# Patient Record
Sex: FEMALE | Race: WHITE | HISPANIC OR LATINO | Employment: FULL TIME | URBAN - METROPOLITAN AREA
[De-identification: names, ages, dates, MRNs, and addresses within clinical notes are randomized per-mention and may not be internally consistent; named-entity substitution may affect disease eponyms.]

---

## 2018-11-14 ENCOUNTER — NON-PROVIDER VISIT (OUTPATIENT)
Dept: URGENT CARE | Facility: CLINIC | Age: 48
End: 2018-11-14

## 2018-11-14 DIAGNOSIS — Z02.1 PRE-EMPLOYMENT DRUG SCREENING: ICD-10-CM

## 2018-11-14 LAB
AMP AMPHETAMINE: NORMAL
BAR BARBITURATES: NORMAL
BZO BENZODIAZEPINES: NORMAL
COC COCAINE: NORMAL
INT CON NEG: NORMAL
INT CON POS: NORMAL
MDMA ECSTASY: NORMAL
MET METHAMPHETAMINES: NORMAL
MTD METHADONE: NORMAL
OPI OPIATES: NORMAL
OXY OXYCODONE: NORMAL
PCP PHENCYCLIDINE: NORMAL
POC URINE DRUG SCREEN OCDRS: NEGATIVE
THC: NORMAL

## 2018-11-14 PROCEDURE — 80305 DRUG TEST PRSMV DIR OPT OBS: CPT | Performed by: PHYSICIAN ASSISTANT

## 2021-03-25 ENCOUNTER — NON-PROVIDER VISIT (OUTPATIENT)
Dept: URGENT CARE | Facility: PHYSICIAN GROUP | Age: 51
End: 2021-03-25

## 2021-03-25 DIAGNOSIS — Z02.1 PRE-EMPLOYMENT DRUG SCREENING: ICD-10-CM

## 2021-03-25 LAB
AMP AMPHETAMINE: NORMAL
COC COCAINE: NORMAL
INT CON NEG: NORMAL
INT CON POS: NORMAL
MET METHAMPHETAMINES: NORMAL
OPI OPIATES: NORMAL
PCP PHENCYCLIDINE: NORMAL
POC DRUG COMMENT 753798-OCCUPATIONAL HEALTH: NORMAL
THC: NORMAL

## 2021-03-25 PROCEDURE — 80305 DRUG TEST PRSMV DIR OPT OBS: CPT | Performed by: FAMILY MEDICINE

## 2022-06-27 ENCOUNTER — TELEPHONE (OUTPATIENT)
Dept: SCHEDULING | Facility: IMAGING CENTER | Age: 52
End: 2022-06-27

## 2022-06-28 ENCOUNTER — OFFICE VISIT (OUTPATIENT)
Dept: MEDICAL GROUP | Facility: IMAGING CENTER | Age: 52
End: 2022-06-28
Payer: COMMERCIAL

## 2022-06-28 VITALS
BODY MASS INDEX: 35.89 KG/M2 | SYSTOLIC BLOOD PRESSURE: 132 MMHG | DIASTOLIC BLOOD PRESSURE: 84 MMHG | HEART RATE: 66 BPM | HEIGHT: 65 IN | TEMPERATURE: 97.7 F | WEIGHT: 215.4 LBS | OXYGEN SATURATION: 98 %

## 2022-06-28 DIAGNOSIS — Z13.6 SCREENING FOR CARDIOVASCULAR CONDITION: ICD-10-CM

## 2022-06-28 DIAGNOSIS — Z12.11 SCREENING FOR COLORECTAL CANCER: ICD-10-CM

## 2022-06-28 DIAGNOSIS — R21 RASH: ICD-10-CM

## 2022-06-28 DIAGNOSIS — Z76.89 ENCOUNTER TO ESTABLISH CARE WITH NEW DOCTOR: ICD-10-CM

## 2022-06-28 DIAGNOSIS — Z11.59 NEED FOR HEPATITIS C SCREENING TEST: ICD-10-CM

## 2022-06-28 DIAGNOSIS — Z13.31 DEPRESSION SCREENING: ICD-10-CM

## 2022-06-28 DIAGNOSIS — Z12.12 SCREENING FOR COLORECTAL CANCER: ICD-10-CM

## 2022-06-28 DIAGNOSIS — Z13.1 DIABETES MELLITUS SCREENING: ICD-10-CM

## 2022-06-28 DIAGNOSIS — Z12.31 ENCOUNTER FOR SCREENING MAMMOGRAM FOR BREAST CANCER: ICD-10-CM

## 2022-06-28 PROCEDURE — 99214 OFFICE O/P EST MOD 30 MIN: CPT | Performed by: CLINICAL NURSE SPECIALIST

## 2022-06-28 RX ORDER — CEPHALEXIN 500 MG/1
500 CAPSULE ORAL 4 TIMES DAILY
Qty: 28 CAPSULE | Refills: 0 | Status: SHIPPED | OUTPATIENT
Start: 2022-06-28

## 2022-06-28 ASSESSMENT — ANXIETY QUESTIONNAIRES
2. NOT BEING ABLE TO STOP OR CONTROL WORRYING: MORE THAN HALF THE DAYS
3. WORRYING TOO MUCH ABOUT DIFFERENT THINGS: MORE THAN HALF THE DAYS
1. FEELING NERVOUS, ANXIOUS, OR ON EDGE: MORE THAN HALF THE DAYS
4. TROUBLE RELAXING: MORE THAN HALF THE DAYS
GAD7 TOTAL SCORE: 8
5. BEING SO RESTLESS THAT IT IS HARD TO SIT STILL: NOT AT ALL
7. FEELING AFRAID AS IF SOMETHING AWFUL MIGHT HAPPEN: NOT AT ALL
6. BECOMING EASILY ANNOYED OR IRRITABLE: NOT AT ALL

## 2022-06-28 ASSESSMENT — PAIN SCALES - GENERAL: PAINLEVEL: 4=SLIGHT-MODERATE PAIN

## 2022-06-28 ASSESSMENT — PATIENT HEALTH QUESTIONNAIRE - PHQ9: CLINICAL INTERPRETATION OF PHQ2 SCORE: 0

## 2022-06-28 NOTE — ASSESSMENT & PLAN NOTE
Firm, itchy papules/nodules erupted 10 days ago starting on scalp and 4 days ago moved to face and behind ear.  Area itches slightly and is tender to touch.  She tried Benadryl with no relief.  No fevers or chills. Mild sore throat yesterday but not today.  She ate sushi the day of the initial lesion.

## 2022-06-28 NOTE — PROGRESS NOTES
"Subjective     Marylu Sanchez is a 52 y.o. female who presents with Bump (Started two weeks ago on head, and now on face and behind left ear. Itching and redness ) and Establish Care            HPI  Marylu is in clinic to establish care. She is [place] speaking.    Rash  Firm, itchy papules/nodules erupted 10 days ago starting on scalp and 4 days ago moved to face and behind ear.  Area itches slightly and is tender to touch.  She tried Benadryl with no relief.  No fevers or chills. Mild sore throat yesterday but not today.  She ate sushi the day of the initial lesion.       ROS  See HPI      No Known Allergies    No current outpatient medications on file prior to visit.     No current facility-administered medications on file prior to visit.           Objective     /84 (BP Location: Left arm, Patient Position: Sitting, BP Cuff Size: Adult)   Pulse 66   Temp 36.5 °C (97.7 °F) (Temporal)   Ht 1.651 m (5' 5\")   Wt 97.7 kg (215 lb 6.4 oz)   SpO2 98%   BMI 35.84 kg/m²      Physical Exam  Constitutional:       General: She is not in acute distress.     Appearance: Normal appearance. She is not ill-appearing, toxic-appearing or diaphoretic.   HENT:      Head: Normocephalic and atraumatic.      Mouth/Throat:      Pharynx: No oropharyngeal exudate or posterior oropharyngeal erythema.   Eyes:      General: No scleral icterus.     Pupils: Pupils are equal, round, and reactive to light.   Cardiovascular:      Rate and Rhythm: Normal rate.   Pulmonary:      Effort: Pulmonary effort is normal.   Skin:     General: Skin is warm and dry.      Comments: Multiple red papules and nodules at anterior hairline, forehead, left upper eyelid and behind left ear.  Hairline papule excoriated.   Neurological:      Mental Status: She is alert and oriented to person, place, and time.      Gait: Gait normal.   Psychiatric:         Mood and Affect: Mood normal.         Behavior: Behavior normal.         Thought Content: " Thought content normal.         Judgment: Judgment normal.                             Assessment & Plan       1. Encounter to establish care with new doctor      2. Depression screening  Score 0    3. Rash  Marylu has a papular erythematous rash on the central and left side of her forehead, left eyelid and left ear.  The papules appear and feel to be somewhat cystic.  They are not a typical presentation for shingles.her left eye is red.  She does deny any ocular changes.  Benadryl did not help.    I believe there is a bacterial component and I am concerned about the eye involvement.  She will be prescribed Keflex 500 mg to be taken 4 times a day for a week.  Marylu is also to apply warm moist compresses to the affected area multiple times a day.    If symptoms fail to improve or if they worsen, she is to notify me immediately.  If her vision changes or she becomes photophobic, she is to go to the emergency room.    - cephALEXin (KEFLEX) 500 MG Cap; Take 1 Capsule by mouth 4 times a day.  Dispense: 28 Capsule; Refill: 0  -Referral to dermatology    4. Encounter for screening mammogram for breast cancer    - MA-SCREENING MAMMO BILAT W/TOMOSYNTHESIS W/CAD; Future    5. Screening for colorectal cancer    - Referral to GI for Colonoscopy    6. Need for hepatitis C screening test    - HEP C VIRUS ANTIBODY; Future    7. Diabetes mellitus screening    - Comp Metabolic Panel; Future  - HEMOGLOBIN A1C; Future    8. Screening for cardiovascular condition    - CBC WITH DIFFERENTIAL; Future  - Lipid Profile; Future  - TSH WITH REFLEX TO FT4; Future  - VITAMIN D,25 HYDROXY; Future    Return in about 1 week (around 7/5/2022), or if symptoms worsen or fail to improve.

## 2022-07-27 ENCOUNTER — TELEPHONE (OUTPATIENT)
Dept: HEALTH INFORMATION MANAGEMENT | Facility: OTHER | Age: 52
End: 2022-07-27

## 2023-04-10 ENCOUNTER — OFFICE VISIT (OUTPATIENT)
Dept: URGENT CARE | Facility: PHYSICIAN GROUP | Age: 53
End: 2023-04-10
Payer: COMMERCIAL

## 2023-04-10 VITALS
OXYGEN SATURATION: 97 % | TEMPERATURE: 98.2 F | HEART RATE: 62 BPM | WEIGHT: 210 LBS | SYSTOLIC BLOOD PRESSURE: 132 MMHG | RESPIRATION RATE: 16 BRPM | DIASTOLIC BLOOD PRESSURE: 84 MMHG | BODY MASS INDEX: 35.85 KG/M2 | HEIGHT: 64 IN

## 2023-04-10 DIAGNOSIS — M25.562 ACUTE PAIN OF LEFT KNEE: ICD-10-CM

## 2023-04-10 PROCEDURE — 99213 OFFICE O/P EST LOW 20 MIN: CPT

## 2023-04-10 RX ORDER — ACETAMINOPHEN 500 MG
500 TABLET ORAL ONCE
Status: COMPLETED | OUTPATIENT
Start: 2023-04-10 | End: 2023-04-10

## 2023-04-10 RX ORDER — KETOROLAC TROMETHAMINE 30 MG/ML
15 INJECTION, SOLUTION INTRAMUSCULAR; INTRAVENOUS ONCE
Status: COMPLETED | OUTPATIENT
Start: 2023-04-10 | End: 2023-04-10

## 2023-04-10 RX ADMIN — KETOROLAC TROMETHAMINE 15 MG: 30 INJECTION, SOLUTION INTRAMUSCULAR; INTRAVENOUS at 17:57

## 2023-04-10 RX ADMIN — Medication 500 MG: at 17:56

## 2023-04-10 NOTE — LETTER
[facility] [place]  [address]  [place] [address]     April 10, 2023    Patient: Marylu Sanchez   YOB: 1970   Date of Visit: 4/10/2023       To Whom It May Concern:    Marylu Sanchez was seen and treated in our department on 4/10/2023.     Sincerely,     RHONDA Perez.

## 2023-04-11 NOTE — PROGRESS NOTES
Chief Complaint   Patient presents with    Knee Pain     Left knee,painful,x5 days       HISTORY OF PRESENT ILLNESS: Patient is a pleasant 53 y.o. female who presents to urgent care today left sided knee pain for the last 5 days, no known cause of injury.  No redness or swelling.  She is able to ambulate.  Took motrin with little relief.      Patient Active Problem List    Diagnosis Date Noted    Rash 06/28/2022       Allergies:Patient has no known allergies.    Current Outpatient Medications Ordered in Epic   Medication Sig Dispense Refill    cephALEXin (KEFLEX) 500 MG Cap Take 1 Capsule by mouth 4 times a day. (Patient not taking: Reported on 4/10/2023) 28 Capsule 0     No current Epic-ordered facility-administered medications on file.       No past medical history on file.    Social History     Tobacco Use    Smoking status: Never    Smokeless tobacco: Never   Vaping Use    Vaping Use: Never used   Substance Use Topics    Alcohol use: Never    Drug use: Never       No family status information on file.   No family history on file.    ROS:  Review of Systems   Constitutional: Negative for fever, chills, weight loss, malaise, and fatigue.   HENT: Negative for ear pain, nosebleeds, congestion, sore throat and neck pain.    Eyes: Negative for vision changes.   Neuro: Negative for headache, sensory changes, weakness, seizure, LOC.   Cardiovascular: Negative for chest pain, palpitations, orthopnea and leg swelling.   Respiratory: Negative for cough, sputum production, shortness of breath and wheezing.   Gastrointestinal: Negative for abdominal pain, nausea, vomiting or diarrhea.   Genitourinary: Negative for dysuria, urgency and frequency.  Musculoskeletal: Negative for falls, neck pain, back pain, joint pain, myalgias.  Noted pain with ambulation to the right knee.  Skin: Negative for rash, diaphoresis.  No redness or swelling noted to her knee.    Exam:  /84 (BP Location: Right arm, Patient Position: Sitting,  "BP Cuff Size: Adult)   Pulse 62   Temp 36.8 °C (98.2 °F) (Temporal)   Resp 16   Ht 1.626 m (5' 4\")   Wt 95.3 kg (210 lb)   SpO2 97%   General: well-nourished, well-developed female in NAD  Head: normocephalic, atraumatic  Eyes: PERRLA, no conjunctival injection, acuity grossly intact, lids normal.  Ears: normal shape and symmetry, no tenderness, no discharge. External canals are without any significant edema or erythema. Tympanic membranes are without any inflammation, no effusion. Gross auditory acuity is intact.  Nose: symmetrical without tenderness, no discharge.  Mouth/Throat: reasonable hygiene, no erythema, exudates or tonsillar enlargement.  Neck: no masses, range of motion within normal limits, no tracheal deviation. No obvious thyroid enlargement.   Lymph: no cervical adenopathy. No supraclavicular adenopathy.   Neuro: alert and oriented. Cranial nerves 1-12 grossly intact. No sensory deficit.   Cardiovascular: regular rate and rhythm. No edema.  Pulmonary: no distress. Chest is symmetrical with respiration, no wheezes, crackles, or rhonchi.   Abdomen: soft, non-tender, no guarding, no hepatosplenomegaly.  Musculoskeletal: no clubbing, appropriate muscle tone, gait is stable.  No pain with extension, rotation of the right knee.  Skin: warm, dry, intact, no clubbing, no cyanosis, no rashes.   Psych: appropriate mood, affect, judgement.         Assessment/Plan:  1. Acute pain of left knee  ketorolac (TORADOL) injection 15 mg    acetaminophen (TYLENOL) tablet 500 mg    Referral to establish with [facility]      This is a 53-year-old female comes in with her  today, she is Serbian-speaking, translation services were utilized.  Patient has ongoing complaints of left-sided knee pain for the last 5 days.  No known cause of injury.  She states it hurts mostly with ambulation.  On physical assessment she has no noted pain with extension, or rotation of her knee.  She has only taken Motrin for relief.  " Toradol, Tylenol were given here in the office today for comfort measures.  Referral was placed to establish care with [facility] if she is not currently established.  Patient advised to continue to take Motrin and Tylenol, imaging not indicated at this time.  Follow-up as needed.    Supportive care, differential diagnoses, and indications for immediate follow-up discussed with patient.   Pathogenesis of diagnosis discussed including typical length and natural progression.   Instructed to return to clinic or nearest emergency department for any change in condition, further concerns, or worsening of symptoms.  Patient states understanding of the plan of care and discharge instructions.  Instructed to make an appointment, for follow up, with  primary care provider.        Please note that this dictation was created using voice recognition software. I have made every reasonable attempt to correct obvious errors, but I expect that there are errors of grammar and possibly content that I did not discover before finalizing the note.      Brisa DAVILA

## 2023-04-21 ENCOUNTER — TELEPHONE (OUTPATIENT)
Dept: HEALTH INFORMATION MANAGEMENT | Facility: OTHER | Age: 53
End: 2023-04-21
Payer: COMMERCIAL

## 2023-04-25 ENCOUNTER — APPOINTMENT (OUTPATIENT)
Dept: RADIOLOGY | Facility: MEDICAL CENTER | Age: 53
End: 2023-04-25
Attending: EMERGENCY MEDICINE
Payer: COMMERCIAL

## 2023-04-25 ENCOUNTER — HOSPITAL ENCOUNTER (EMERGENCY)
Facility: MEDICAL CENTER | Age: 53
End: 2023-04-25
Attending: EMERGENCY MEDICINE
Payer: COMMERCIAL

## 2023-04-25 VITALS
HEART RATE: 65 BPM | RESPIRATION RATE: 16 BRPM | OXYGEN SATURATION: 97 % | SYSTOLIC BLOOD PRESSURE: 165 MMHG | TEMPERATURE: 96.6 F | BODY MASS INDEX: 36.78 KG/M2 | DIASTOLIC BLOOD PRESSURE: 83 MMHG | WEIGHT: 214.29 LBS

## 2023-04-25 DIAGNOSIS — S83.412A SPRAIN OF MEDIAL COLLATERAL LIGAMENT OF LEFT KNEE, INITIAL ENCOUNTER: ICD-10-CM

## 2023-04-25 PROCEDURE — 73564 X-RAY EXAM KNEE 4 OR MORE: CPT | Mod: LT

## 2023-04-25 PROCEDURE — 700102 HCHG RX REV CODE 250 W/ 637 OVERRIDE(OP): Performed by: EMERGENCY MEDICINE

## 2023-04-25 PROCEDURE — A9270 NON-COVERED ITEM OR SERVICE: HCPCS | Performed by: EMERGENCY MEDICINE

## 2023-04-25 PROCEDURE — 99284 EMERGENCY DEPT VISIT MOD MDM: CPT

## 2023-04-25 PROCEDURE — 700101 HCHG RX REV CODE 250: Performed by: EMERGENCY MEDICINE

## 2023-04-25 RX ORDER — DICLOFENAC SODIUM 75 MG/1
75 TABLET, DELAYED RELEASE ORAL 2 TIMES DAILY
Qty: 60 TABLET | Refills: 0 | Status: SHIPPED | OUTPATIENT
Start: 2023-04-25

## 2023-04-25 RX ORDER — HYDROCODONE BITARTRATE AND ACETAMINOPHEN 5; 325 MG/1; MG/1
1 TABLET ORAL ONCE
Status: COMPLETED | OUTPATIENT
Start: 2023-04-25 | End: 2023-04-25

## 2023-04-25 RX ORDER — LIDOCAINE 50 MG/G
1 PATCH TOPICAL ONCE
Status: DISCONTINUED | OUTPATIENT
Start: 2023-04-25 | End: 2023-04-25 | Stop reason: HOSPADM

## 2023-04-25 RX ADMIN — HYDROCODONE BITARTRATE AND ACETAMINOPHEN 1 TABLET: 5; 325 TABLET ORAL at 08:15

## 2023-04-25 RX ADMIN — LIDOCAINE 1 PATCH: 50 PATCH CUTANEOUS at 08:15

## 2023-04-25 ASSESSMENT — PAIN DESCRIPTION - PAIN TYPE
TYPE: ACUTE PAIN
TYPE: ACUTE PAIN

## 2023-04-25 NOTE — ED TRIAGE NOTES
Chief Complaint   Patient presents with    Leg Pain     Left knee x1.5 weeks. Has been taking ibuprofen but not improving.     Pt ambulatory to triage.  Denies injury.  BP (!) 181/95   Pulse 64   Temp 35.8 °C (96.5 °F) (Temporal)   Resp 14   Wt 97.2 kg (214 lb 4.6 oz)   SpO2 99%   Pt informed of wait times. Educated on triage process.  Asked to return to triage RN for any new or worsening of symptoms. Thanked for patience.

## 2023-04-25 NOTE — ED NOTES
Pt wheeled back to room by family. Pt able to stand and walk to bed with out assistance. Pt is gowned sitting up right in bed with no needs at this time. Chart up for ERP.

## 2023-04-25 NOTE — DISCHARGE INSTRUCTIONS
Use the Ace bandage, you can also  some numbing patches at [facility] or [facility], these are over-the-counter, look for Lidoderm patches.  IcyHot or Tiger balm may also help.  Take the Voltaren as needed, use the Ace bandage to help support your knee.

## 2023-04-25 NOTE — Clinical Note
Marylu Sanchez was seen and treated in our emergency department on 4/25/2023.  She may return to work on 05/02/2023.       If you have any questions or concerns, please don't hesitate to call.      Esvin Schwab M.D.

## 2023-04-25 NOTE — ED PROVIDER NOTES
ED Provider Note    CHIEF COMPLAINT  Chief Complaint   Patient presents with    Leg Pain     Left knee x1.5 weeks. Has been taking ibuprofen but not improving.       EXTERNAL RECORDS REVIEWED  Office visit 4/10/2023 for knee pain    HPI/ROS  LIMITATION TO HISTORY   Select: Language Kyrgyz,  Used       Mayrlu Sanchez is a 53 y.o. female who presents leg pain for the last 1.5 weeks.  Was seen 4/10/2023 for identical complaint, she was thought to have a knee strain or sprain and therefore told to take Motrin and Tylenol, weightbearing as tolerated.  Patient reports symptoms are ongoing and therefore came to the emergency department.  Patient has a relatively physically demanding job, she is ambulatory throughout her job and has to climb multiple stairs,  things and twist often.  She denies any ankle pain or hip pain.  She denies any leg swelling.  Patient reports pain is worse bending her knee and walking up stairs.  Patient denies any associated chest pain or shortness of breath.  She denies any associated weakness or numbness.  Patient does not recall any major trauma but again she has a relatively physically demanding job    PAST MEDICAL HISTORY       SURGICAL HISTORY  patient denies any surgical history    FAMILY HISTORY  No family history on file.    SOCIAL HISTORY  Social History     Tobacco Use    Smoking status: Never    Smokeless tobacco: Never   Vaping Use    Vaping Use: Never used   Substance and Sexual Activity    Alcohol use: Never    Drug use: Never    Sexual activity: Not on file       CURRENT MEDICATIONS  Home Medications       Reviewed by Toshia Tan R.N. (Registered Nurse) on 04/25/23 at 0724  Med List Status: Partial     Medication Last Dose Status   cephALEXin (KEFLEX) 500 MG Cap  Active                    ALLERGIES  No Known Allergies    PHYSICAL EXAM  VITAL SIGNS: BP (!) 181/95   Pulse 64   Temp 35.8 °C (96.5 °F) (Temporal)   Resp 14   Wt 97.2 kg (214 lb  4.6 oz)   SpO2 99%   BMI 36.78 kg/m²    Physical Exam  Constitutional:       Appearance: Normal appearance.   HENT:      Mouth/Throat:      Mouth: Mucous membranes are moist.   Pulmonary:      Effort: Pulmonary effort is normal.   Musculoskeletal:      Comments: Ankle is unremarkable, foot is unremarkable, DP and PT are 2+.  There is no associated edema of the lower extremity.  There is no tenderness of the calf, Homans' sign is negative.  There is no tenderness of the popliteal fossa or thigh.  There is focal tenderness of the medial joint line, medial along the MCL ligament.  Pain is provoked on valgus stressing, no pain on varus stressing, anterior drawer and posterior drawer are unremarkable.  McArdle's test is negative.  No tenderness of the patella.  No joint laxity   Neurological:      General: No focal deficit present.      Mental Status: She is alert and oriented to person, place, and time.   Psychiatric:         Mood and Affect: Mood normal.         DIAGNOSTIC STUDIES / PROCEDURES    RADIOLOGY  I have independently interpreted the diagnostic imaging associated with this visit and am waiting the final reading from the radiologist.   My preliminary interpretation is as follows: unremarkable x-ray of knee  Radiologist interpretation:   DX-KNEE COMPLETE 4+ LEFT   Final Result         1.  No acute traumatic bony injury.            COURSE & MEDICAL DECISION MAKING        INITIAL ASSESSMENT, COURSE AND PLAN  Care Narrative: Here with what appears to be a very mild MCL ligament injury.  She is very well-appearing otherwise.  Neurovascular intact.  She has no associated leg swelling, no associated tenderness of the posterior vascular compartments, to suggest a DVT.  I have a very low suspicion of this.  Will check x-ray for possible occult bony abnormality though my suspicion of fracture is low here.  X-rays unremarkable.  Patient will be discharged home with Voltaren, instructions to take Lidoderm patches,  IcyHot and will also be given a Ace bandage.  If symptoms fail to resolve despite a week off of work or they are worsening patient will follow-up with orthopedics, I provided a referral.        DISPOSITION AND DISCUSSIONS    Escalation of care considered, and ultimately not performed: Patient without any associated fevers, she has full range of motion of her knee, she point tenderness.  No symptoms to suggest a DVT and exam inconsistent with this.  Therefore imaging such as ultrasound was deferred, basic labs and blood cultures as well as inflammatory markers deferred given my low clinical suspicion of septic arthritis      FINAL DIAGNOSIS  1. Sprain of medial collateral ligament of left knee, initial encounter

## 2023-04-25 NOTE — ED NOTES
Pt sitting upright in bed in no obvious distress at this time. Discharge information and instructions given/discussed with Pt. Pt acknowledged information. Pt wheeled out to ER lobby by family for their ride.